# Patient Record
(demographics unavailable — no encounter records)

---

## 2024-10-28 NOTE — PHYSICAL EXAM
[No Acute Distress] : no acute distress [Well Nourished] : well nourished [Well Developed] : well developed [Well-Appearing] : well-appearing foot pain/injury [Normal Sclera/Conjunctiva] : normal sclera/conjunctiva [PERRL] : pupils equal round and reactive to light [EOMI] : extraocular movements intact [Normal Outer Ear/Nose] : the outer ears and nose were normal in appearance [Normal Oropharynx] : the oropharynx was normal [No JVD] : no jugular venous distention [No Lymphadenopathy] : no lymphadenopathy [Supple] : supple [Thyroid Normal, No Nodules] : the thyroid was normal and there were no nodules present [No Respiratory Distress] : no respiratory distress  [No Accessory Muscle Use] : no accessory muscle use [Clear to Auscultation] : lungs were clear to auscultation bilaterally [Normal Rate] : normal rate  [Regular Rhythm] : with a regular rhythm [Normal S1, S2] : normal S1 and S2 [No Murmur] : no murmur heard [No Carotid Bruits] : no carotid bruits [No Abdominal Bruit] : a ~M bruit was not heard ~T in the abdomen [No Varicosities] : no varicosities [Pedal Pulses Present] : the pedal pulses are present [No Edema] : there was no peripheral edema [No Palpable Aorta] : no palpable aorta [No Extremity Clubbing/Cyanosis] : no extremity clubbing/cyanosis [Soft] : abdomen soft [Non Tender] : non-tender [Non-distended] : non-distended [No Masses] : no abdominal mass palpated [No HSM] : no HSM [Normal Bowel Sounds] : normal bowel sounds [Normal Posterior Cervical Nodes] : no posterior cervical lymphadenopathy [Normal Anterior Cervical Nodes] : no anterior cervical lymphadenopathy [No CVA Tenderness] : no CVA  tenderness [No Spinal Tenderness] : no spinal tenderness [No Joint Swelling] : no joint swelling [Grossly Normal Strength/Tone] : grossly normal strength/tone [No Rash] : no rash [Coordination Grossly Intact] : coordination grossly intact [No Focal Deficits] : no focal deficits [Normal Gait] : normal gait [Deep Tendon Reflexes (DTR)] : deep tendon reflexes were 2+ and symmetric [Normal Affect] : the affect was normal [Normal Insight/Judgement] : insight and judgment were intact

## 2024-10-28 NOTE — PLAN
[FreeTextEntry1] : EKG - NSR -72 , SHANA - 0.164, No acute T wave changes noted..  Labs Drawn by Dr. Jovani Amador due to poor venous access.  Patient required lab testing due to conditions in their past medical history requiring periodic monitoring.  Labs were sent to [facility].  UA - Bld - Neg           Aaron - Neg          Nit - Neg           Glu - Neg           Pro - Neg           Ket - Neg.  Start meloxicam as prescribed. rest and Ice Patient to continue with present medications - all medications reconciled/reviewed during this visit and listed above.  Increase fluid intake..  RTO in 7-10 days for re-eval..

## 2024-10-28 NOTE — HISTORY OF PRESENT ILLNESS
[FreeTextEntry1] : Physical exam [de-identified] : Patient presents today for physical exam. His last PE was over a year ago and since that time he has not had any significant changes to his medical history. Denies any CP, SOB or diff breathing. No recent fever, chills, cough or cold type symptoms. Has been having issues with his elbows for a few weeks.  Was seen at rehab which has been helping.  Has no other complaints at this time.

## 2024-11-08 NOTE — PLAN
[FreeTextEntry1] : Labs reviewed with patient during this encounter.  trig = 151 Test = 270  Patient to continue with present medications - all medications reconciled/reviewed during this visit and listed above Patient to start Vitamin therapy as discussed during visit Increase fluid intake..  RTO for repeat labs in 6 months.  RTO in 6 months for re-evaluation.  Diet teaching for at least 8 minutes.performed in depth during this visit related to cholesterol and cardiovascular risks. At least 25 minutes was spent with patient via video conference reviewing findings/results during this visit. Ample time was provided to answer any questions or address concerns to the patients satisfaction..  Patient was advised that this audiovisual encounter constitutes a billable visit, and that the visit will be billed on the same terms and conditions as an in-office, face-to-face visit. Patient was further advised they may be responsible for any co-payment, co-insurance, or other self-payment they would normally pay for an office visit, unless such self-payment was waived by their insurance carrier. 		 At this time the patient is not willing to begin medication therapy for Cholesterol and wishes to modify diet in attempt to correct levels. The risk of elevated lipids were discussed at length during this visit and patient states understanding..

## 2024-11-08 NOTE — HISTORY OF PRESENT ILLNESS
[Home] : at home, [unfilled] , at the time of the visit. [Medical Office: ([facility])___] : at the medical office located in  [Verbal consent obtained from patient] : the patient, [unfilled] [FreeTextEntry1] : Cholesterol [de-identified] : Patient encounter today for re-evaluation of cholesterol.  States he has been doing well.  Denies any CP, SOB or diff breathing.  No recent fever, chills, cough or cold type symptoms.  Has no other complaints at this time.

## 2024-12-23 NOTE — HISTORY OF PRESENT ILLNESS
[de-identified] : Age: 46 year M PMHx: No PMHx Hand Dominance: RHD Chief Complaint: left elbow pain Trauma: Patient reports having pain for ~ 5 months with NKI. Patient reports his pain radiating into the forearm, denies numbness/tingling. Patient reports difficulty when lifting weights. Patient saw Dr. Amador and was prescribed Meloxicam 15mg.  Outside Imaging/Treatment: none OTC Medications: Advil/Aleve PRN OT/PT: PT Bracing: none Pain worse with: lifting weights Pain better with: OTC medication, ice

## 2024-12-23 NOTE — IMAGING
[de-identified] : LEFT ELBOW EXAM +ttp at lateral epicondyle. Skin intact No deformity, edema, or ecchymosis Hand with brisk capillary refill, warm and well perfused Motor function intact to AIN, PIN, ulnar nerves Sensation intact median, ulnar, radial nerves Elbow ROM   Flexion 135   Extension to 0   Supination 85   Pronation 85 No elbow instability noted Strength for elbow flexion & extension is 5/5 Hand and wrist motion is intact and full Patient able to make a composite fist  Left elbow radiographs with no fracture nor dislocation. (3-view)

## 2024-12-23 NOTE — HISTORY OF PRESENT ILLNESS
[de-identified] : Age: 46 year M PMHx: No PMHx Hand Dominance: RHD Chief Complaint: left elbow pain Trauma: Patient reports having pain for ~ 5 months with NKI. Patient reports his pain radiating into the forearm, denies numbness/tingling. Patient reports difficulty when lifting weights. Patient saw Dr. Amador and was prescribed Meloxicam 15mg.  Outside Imaging/Treatment: none OTC Medications: Advil/Aleve PRN OT/PT: PT Bracing: none Pain worse with: lifting weights Pain better with: OTC medication, ice

## 2024-12-23 NOTE — IMAGING
[de-identified] : LEFT ELBOW EXAM +ttp at lateral epicondyle. Skin intact No deformity, edema, or ecchymosis Hand with brisk capillary refill, warm and well perfused Motor function intact to AIN, PIN, ulnar nerves Sensation intact median, ulnar, radial nerves Elbow ROM   Flexion 135   Extension to 0   Supination 85   Pronation 85 No elbow instability noted Strength for elbow flexion & extension is 5/5 Hand and wrist motion is intact and full Patient able to make a composite fist  Left elbow radiographs with no fracture nor dislocation. (3-view)

## 2024-12-23 NOTE — ASSESSMENT
[FreeTextEntry1] : Left Lateral Epicondylitis The diagnosis of lateral epicondylitis and treatment options were discussed at length with the patient today.  I discussed that in terms of the natural history of the condition, it can sometimes take many months to improve.  I discussed treatment options including observation, activity modification including avoiding lifting with the hand pronated and instead lifting with the hand supinated, oral anti-inflammatories, hand therapy, counterforce brace, and steroid injection.  I discussed that for the steroid injection, the literature proves this to be no better than a placebo, however, it is still a potential option for the patient.  Furthermore, if this condition is recalcitrant, I discussed obtaining an MRI scan to assess both the lateral epicondyle as well as the radiocapitellar joint and specifically for a potential plica that could be impinging on the radiocapitellar joint. All of the patient's questions were answered to their satisfaction.  F/u prn

## 2025-07-07 NOTE — PHYSICAL EXAM
[No Acute Distress] : no acute distress [Well Nourished] : well nourished [Well Developed] : well developed [Well-Appearing] : well-appearing [Normal Voice/Communication] : normal voice/communication [Normal Sclera/Conjunctiva] : normal sclera/conjunctiva [PERRL] : pupils equal round and reactive to light [EOMI] : extraocular movements intact [Normal Outer Ear/Nose] : the outer ears and nose were normal in appearance [Normal Oropharynx] : the oropharynx was normal [No JVD] : no jugular venous distention [No Lymphadenopathy] : no lymphadenopathy [Supple] : supple [No Respiratory Distress] : no respiratory distress  [No Accessory Muscle Use] : no accessory muscle use [Clear to Auscultation] : lungs were clear to auscultation bilaterally [Normal Rate] : normal rate  [Regular Rhythm] : with a regular rhythm [Normal S1, S2] : normal S1 and S2 [Normal Supraclavicular Nodes] : no supraclavicular lymphadenopathy [Normal Posterior Cervical Nodes] : no posterior cervical lymphadenopathy [Normal Anterior Cervical Nodes] : no anterior cervical lymphadenopathy [No Joint Swelling] : no joint swelling [Grossly Normal Strength/Tone] : grossly normal strength/tone [No Rash] : no rash [Coordination Grossly Intact] : coordination grossly intact [No Focal Deficits] : no focal deficits [Normal Gait] : normal gait [Speech Grossly Normal] : speech grossly normal [Normal Affect] : the affect was normal [Alert and Oriented x3] : oriented to person, place, and time [Normal Mood] : the mood was normal [Normal Insight/Judgement] : insight and judgment were intact [de-identified] : See plan of care for full back exam

## 2025-07-07 NOTE — PHYSICAL EXAM
[No Acute Distress] : no acute distress [Well Nourished] : well nourished [Well Developed] : well developed [Well-Appearing] : well-appearing [Normal Voice/Communication] : normal voice/communication [Normal Sclera/Conjunctiva] : normal sclera/conjunctiva [PERRL] : pupils equal round and reactive to light [EOMI] : extraocular movements intact [Normal Outer Ear/Nose] : the outer ears and nose were normal in appearance [Normal Oropharynx] : the oropharynx was normal [No JVD] : no jugular venous distention [No Lymphadenopathy] : no lymphadenopathy [Supple] : supple [No Respiratory Distress] : no respiratory distress  [No Accessory Muscle Use] : no accessory muscle use [Clear to Auscultation] : lungs were clear to auscultation bilaterally [Normal Rate] : normal rate  [Regular Rhythm] : with a regular rhythm [Normal S1, S2] : normal S1 and S2 [Normal Supraclavicular Nodes] : no supraclavicular lymphadenopathy [Normal Posterior Cervical Nodes] : no posterior cervical lymphadenopathy [Normal Anterior Cervical Nodes] : no anterior cervical lymphadenopathy [No Joint Swelling] : no joint swelling [Grossly Normal Strength/Tone] : grossly normal strength/tone [No Rash] : no rash [Coordination Grossly Intact] : coordination grossly intact [No Focal Deficits] : no focal deficits [Normal Gait] : normal gait [Speech Grossly Normal] : speech grossly normal [Normal Affect] : the affect was normal [Alert and Oriented x3] : oriented to person, place, and time [Normal Mood] : the mood was normal [Normal Insight/Judgement] : insight and judgment were intact [de-identified] : See plan of care for full back exam

## 2025-07-07 NOTE — HISTORY OF PRESENT ILLNESS
[FreeTextEntry1] : Chief Complaint Lower back pain since Saturday, "excruciating" when transitioning from sitting to standing or vice versa, worse after 20 minutes in car [de-identified] : History of Present Illness Solitario Chavez presents with lower back pain that started after an intense workout regimen last week. The patient typically exercises once a week but increased his workout frequency to 4-5 days last week. On Saturday, while doing a calf course, he experienced sudden, excruciating pain in his lower back.  Over the past day and a half, the pain has been particularly severe when transitioning from sitting to standing and vice versa. The patient reports that lying down is comfortable, and he slept well. However, after sitting in a car for 20 minutes, it takes him a minute to get out of the seat due to pain. The patient notes that walking helps alleviate the discomfort. He took two muscle relaxers yesterday morning along with one Aleve, which provided marginal improvement. The pain is localized to the lower back on both sides, with no radiation to the buttocks or legs. The patient denies any tingling down the legs or numbness in the groin. Twisting movements are tolerable, but leaning over and transitioning between sitting and standing positions exacerbate the pain significantly.  To manage his symptoms, the patient has been using leftover muscle relaxers from a previous visit for shoulder and neck pain. He is unsure if the current issue is due to the passage of time or a muscle strain. The pain is impacting his mobility, particularly when getting in and out of vehicles.  Medical History - Lower back pain, previously treated with muscle relaxers - Prior shoulder and neck pain, treated with muscle relaxers  Medications and Supplements - Muscle relaxers   - Leftover from previous visit for shoulder and neck   - Took two yesterday morning - Aleve   - Took one yesterday morning  Social History - Exercise: Works out once a week typically; recently increased to 4-5 days per week  Review of Systems Musculoskeletal: Positive for lower back pain, worse with sitting to standing and standing to sitting. Negative for tingling down legs, numbness in groin, or pain radiating to buttocks. Neurological: Negative for radicular pain.

## 2025-07-07 NOTE — HISTORY OF PRESENT ILLNESS
[FreeTextEntry1] : Chief Complaint Lower back pain since Saturday, "excruciating" when transitioning from sitting to standing or vice versa, worse after 20 minutes in car [de-identified] : History of Present Illness Solitario Chavez presents with lower back pain that started after an intense workout regimen last week. The patient typically exercises once a week but increased his workout frequency to 4-5 days last week. On Saturday, while doing a calf course, he experienced sudden, excruciating pain in his lower back.  Over the past day and a half, the pain has been particularly severe when transitioning from sitting to standing and vice versa. The patient reports that lying down is comfortable, and he slept well. However, after sitting in a car for 20 minutes, it takes him a minute to get out of the seat due to pain. The patient notes that walking helps alleviate the discomfort. He took two muscle relaxers yesterday morning along with one Aleve, which provided marginal improvement. The pain is localized to the lower back on both sides, with no radiation to the buttocks or legs. The patient denies any tingling down the legs or numbness in the groin. Twisting movements are tolerable, but leaning over and transitioning between sitting and standing positions exacerbate the pain significantly.  To manage his symptoms, the patient has been using leftover muscle relaxers from a previous visit for shoulder and neck pain. He is unsure if the current issue is due to the passage of time or a muscle strain. The pain is impacting his mobility, particularly when getting in and out of vehicles.  Medical History - Lower back pain, previously treated with muscle relaxers - Prior shoulder and neck pain, treated with muscle relaxers  Medications and Supplements - Muscle relaxers   - Leftover from previous visit for shoulder and neck   - Took two yesterday morning - Aleve   - Took one yesterday morning  Social History - Exercise: Works out once a week typically; recently increased to 4-5 days per week  Review of Systems Musculoskeletal: Positive for lower back pain, worse with sitting to standing and standing to sitting. Negative for tingling down legs, numbness in groin, or pain radiating to buttocks. Neurological: Negative for radicular pain.

## 2025-07-07 NOTE — PLAN
[FreeTextEntry1] : The patient is a well appearing 47 year old male of their stated age. Patient ambulates with a normal gait.  Ribs: Deformity: None Tenderness to Palpation: None Rib Compression Test: Negative  Thoracic Spine: Range of Motion: Unrestricted Deformity: None Tenderness to Palpation: None Sensation: Intact to Light Touch  Lumbar Spine: RANGE OF MOTION: Flexion: Restricted and with pain Extension: Unrestricted and without pain Rotation: Unrestricted and without pain  TENDERNESS TO PALPATION: Midline/Vertebral Bodies/Spinous Processes: Negative Paraspinal Muscles: Positive bilateral L1-L5 SI Joints: Negative  PROVOCATIVE TESTING: Piriformis Stretch Test: Negative Straight Leg Raise: Negative Seated Straight Leg Raise: Negative Pelvic Compression Test: Negative  INSPECTION: Deformity: Negative Erythema: Negative Ecchymosis: Negative Abrasions: Negative Muscle Spasm: Paraspinal muscle, lumbar bialteral   NEUROLOGIC EXAM: Sensation L2-S1: Grossly Intact DTRs: 2+ and Symmetric Babinski: Negative Clonus: Negative  MOTOR EXAM: Quadriceps: 5 out of 5 Hamstrings: 5 out of 5 Hip ABduction: 5 out of 5 Hip ADduction: 5 out of 5 Hip Flexion: 5 out of 5 TA: 5 out of 5 GS: 5 out of 5 EHL: 5 out of 5 FHL: 5 out of 5  Circulatory/Pulses: Dorsalis Pedis: 2+ Posterior Tibialis: 2+  Assessment: The patient is a 47 year old male with acute low back pain without trauma due to  overuse working out and radiographic and physical exam findings consistent with Lumbar sprain, initial encounter; Lumbar paraspinal muscle spasm  Medical Decision Making Solitario Chavez presents with acute lower back pain that started after increased workout frequency and a calf exercise course. The pain is exacerbated by transitioning from sitting to standing and vice versa, but improves with walking. The absence of radicular symptoms, tingling, numbness, or groin involvement suggests a muscular strain rather than nerve impingement. Physical examination, including forward flexion, hyperextension, and rotation, did not elicit significant pain, further supporting a diagnosis of muscle strain. The location of pain in the lower back stabilizing muscles is consistent with overexertion from increased workout intensity. Given the presentation and examination findings, more serious conditions such as herniated disc or spinal stenosis are less likely. The treatment plan focuses on conservative management with medications, gentle movement, and heat therapy, as time is expected to be the primary healing factor. Physical therapy was considered as an additional treatment option to aid in recovery and prevent future strains.  Acute lower back strain Assessment: Patient presents with acute lower back pain following increased workout frequency and intensity. Pain is bilateral, exacerbated by transitioning from sitting to standing and vice versa. No radicular symptoms or neurological deficits noted. Physical examination reveals normal range of motion without pain on hyperextension. Given the absence of red flag symptoms and the temporal relationship to increased physical activity, the most likely diagnosis is an acute lower back strain involving the stabilizing muscles of the spine. Plan: - Prescribe baclofen 10 mg PO BID for 7 days - Prescribe naproxen 500 mg PO BID for 7 days - Recommend acetaminophen 325 mg, 2 tablets PO q4-6h PRN for breakthrough pain - Provide lidocaine patch for topical pain relief, to be used 12 hours on, 12 hours off.  - Advise application of heating pad for 10-15 minutes every 1-2 hours. never place directly on the skin as this can cause burns - Encourage light activity and movement, avoiding bed rest and heavy lifting - Provide prescription for physical therapy - Follow up if symptoms persist or worsen.  Will consider orthopedics referral and MRI at that point Discussed and reviewed current medications  Patient to continue with present medications - all medications reconciled/reviewed during this visit and listed above. The patient's current medication management of their orthopedic diagnosis was reviewed today: (1) We discussed a comprehensive treatment plan that included possible pharmaceutical management involving the use of prescription strength medications including but not limited to options such as oral Naprosyn 500mg BID, once daily Meloxicam 15 mg, or 500-650 mg Tylenol versus over the counter oral medications and topical prescription NSAID Pennsaid vs over the counter Voltaren gel. (2) There is a moderate risk of morbidity with further treatment, especially from use of prescription strength medications and possible side effects of these medications which include upset stomach with oral medications, skin reactions to topical medications and cardiac/renal issues with long term use. (3) I recommended that the patient follow-up with their medical physician to discuss any significant specific potential issues with long term medication use such as interactions with current medications or with exacerbation of underlying medical comorbidities. (4) The benefits and risks associated with use of injectable, oral or topical, prescription and over the counter anti-inflammatory medications were discussed with the patient. The patient voiced understanding of the risks including but not limited to bleeding, stroke, kidney dysfunction, heart disease, and were referred to the black box warning label for further information.   Increase fluid intake.  RTO in 21 days for re-evaluation only if needed.  At least 30 minutes was spent with patient face to face examining and reviewing findings/results during this visit. Ample time was provided to answer any questions or address concerns to the patients satisfaction.